# Patient Record
Sex: FEMALE | Race: BLACK OR AFRICAN AMERICAN | NOT HISPANIC OR LATINO | URBAN - METROPOLITAN AREA
[De-identification: names, ages, dates, MRNs, and addresses within clinical notes are randomized per-mention and may not be internally consistent; named-entity substitution may affect disease eponyms.]

---

## 2017-06-17 ENCOUNTER — EMERGENCY (EMERGENCY)
Facility: HOSPITAL | Age: 57
LOS: 0 days | Discharge: HOME | End: 2017-06-18
Attending: EMERGENCY MEDICINE
Payer: COMMERCIAL

## 2017-06-17 VITALS
HEART RATE: 102 BPM | DIASTOLIC BLOOD PRESSURE: 74 MMHG | SYSTOLIC BLOOD PRESSURE: 140 MMHG | RESPIRATION RATE: 25 BRPM | TEMPERATURE: 98 F | WEIGHT: 270.07 LBS | HEIGHT: 64 IN

## 2017-06-17 DIAGNOSIS — R07.89 OTHER CHEST PAIN: ICD-10-CM

## 2017-06-17 DIAGNOSIS — Z98.89 OTHER SPECIFIED POSTPROCEDURAL STATES: Chronic | ICD-10-CM

## 2017-06-17 DIAGNOSIS — I10 ESSENTIAL (PRIMARY) HYPERTENSION: ICD-10-CM

## 2017-06-17 DIAGNOSIS — E66.9 OBESITY, UNSPECIFIED: ICD-10-CM

## 2017-06-17 DIAGNOSIS — Z79.82 LONG TERM (CURRENT) USE OF ASPIRIN: ICD-10-CM

## 2017-06-17 DIAGNOSIS — Z79.4 LONG TERM (CURRENT) USE OF INSULIN: ICD-10-CM

## 2017-06-17 DIAGNOSIS — E10.9 TYPE 1 DIABETES MELLITUS WITHOUT COMPLICATIONS: ICD-10-CM

## 2017-06-17 LAB
ALBUMIN SERPL ELPH-MCNC: 3.7 G/DL — SIGNIFICANT CHANGE UP (ref 3.3–5)
ALP SERPL-CCNC: 76 U/L — SIGNIFICANT CHANGE UP (ref 40–120)
ALT FLD-CCNC: 23 U/L — SIGNIFICANT CHANGE UP (ref 12–78)
ANION GAP SERPL CALC-SCNC: 8 MMOL/L — SIGNIFICANT CHANGE UP (ref 5–17)
APTT BLD: 31.5 SEC — SIGNIFICANT CHANGE UP (ref 27.5–37.4)
AST SERPL-CCNC: 15 U/L — SIGNIFICANT CHANGE UP (ref 15–37)
BASOPHILS # BLD AUTO: 0.1 K/UL — SIGNIFICANT CHANGE UP (ref 0–0.2)
BASOPHILS NFR BLD AUTO: 1.1 % — SIGNIFICANT CHANGE UP (ref 0–2)
BILIRUB DIRECT SERPL-MCNC: 0.11 MG/DL — SIGNIFICANT CHANGE UP (ref 0.05–0.2)
BILIRUB INDIRECT FLD-MCNC: 0.2 MG/DL — SIGNIFICANT CHANGE UP (ref 0.2–1)
BILIRUB SERPL-MCNC: 0.3 MG/DL — SIGNIFICANT CHANGE UP (ref 0.2–1.2)
BILIRUB SERPL-MCNC: 0.3 MG/DL — SIGNIFICANT CHANGE UP (ref 0.2–1.2)
BLD GP AB SCN SERPL QL: SIGNIFICANT CHANGE UP
BUN SERPL-MCNC: 11 MG/DL — SIGNIFICANT CHANGE UP (ref 7–23)
CALCIUM SERPL-MCNC: 9.2 MG/DL — SIGNIFICANT CHANGE UP (ref 8.5–10.1)
CHLORIDE SERPL-SCNC: 103 MMOL/L — SIGNIFICANT CHANGE UP (ref 96–108)
CK MB CFR SERPL CALC: 1.9 NG/ML — SIGNIFICANT CHANGE UP (ref 0.5–3.6)
CO2 SERPL-SCNC: 30 MMOL/L — SIGNIFICANT CHANGE UP (ref 22–31)
CREAT SERPL-MCNC: 0.98 MG/DL — SIGNIFICANT CHANGE UP (ref 0.5–1.3)
D DIMER BLD IA.RAPID-MCNC: <150 NG/ML DDU — SIGNIFICANT CHANGE UP
EOSINOPHIL # BLD AUTO: 0.1 K/UL — SIGNIFICANT CHANGE UP (ref 0–0.5)
EOSINOPHIL NFR BLD AUTO: 1.2 % — SIGNIFICANT CHANGE UP (ref 0–6)
GLUCOSE SERPL-MCNC: 249 MG/DL — HIGH (ref 70–99)
HCT VFR BLD CALC: 40.3 % — SIGNIFICANT CHANGE UP (ref 34.5–45)
HGB BLD-MCNC: 13.4 G/DL — SIGNIFICANT CHANGE UP (ref 11.5–15.5)
INR BLD: 1.05 RATIO — SIGNIFICANT CHANGE UP (ref 0.88–1.16)
LIDOCAIN IGE QN: 195 U/L — SIGNIFICANT CHANGE UP (ref 73–393)
LYMPHOCYTES # BLD AUTO: 2.7 K/UL — SIGNIFICANT CHANGE UP (ref 1–3.3)
LYMPHOCYTES # BLD AUTO: 28 % — SIGNIFICANT CHANGE UP (ref 13–44)
MAGNESIUM SERPL-MCNC: 1.9 MG/DL — SIGNIFICANT CHANGE UP (ref 1.6–2.6)
MCHC RBC-ENTMCNC: 26.5 PG — LOW (ref 27–34)
MCHC RBC-ENTMCNC: 33.3 GM/DL — SIGNIFICANT CHANGE UP (ref 32–36)
MCV RBC AUTO: 79.6 FL — LOW (ref 80–100)
MONOCYTES # BLD AUTO: 0.5 K/UL — SIGNIFICANT CHANGE UP (ref 0–0.9)
MONOCYTES NFR BLD AUTO: 5.3 % — SIGNIFICANT CHANGE UP (ref 2–14)
NEUTROPHILS # BLD AUTO: 6.1 K/UL — SIGNIFICANT CHANGE UP (ref 1.8–7.4)
NEUTROPHILS NFR BLD AUTO: 64.4 % — SIGNIFICANT CHANGE UP (ref 43–77)
NT-PROBNP SERPL-SCNC: 44 PG/ML — SIGNIFICANT CHANGE UP (ref 0–125)
PLATELET # BLD AUTO: 217 K/UL — SIGNIFICANT CHANGE UP (ref 150–400)
POTASSIUM SERPL-MCNC: 3.8 MMOL/L — SIGNIFICANT CHANGE UP (ref 3.5–5.3)
POTASSIUM SERPL-SCNC: 3.8 MMOL/L — SIGNIFICANT CHANGE UP (ref 3.5–5.3)
PROT SERPL-MCNC: 8.1 GM/DL — SIGNIFICANT CHANGE UP (ref 6–8.3)
PROTHROM AB SERPL-ACNC: 11.5 SEC — SIGNIFICANT CHANGE UP (ref 9.8–12.7)
RBC # BLD: 5.06 M/UL — SIGNIFICANT CHANGE UP (ref 3.8–5.2)
RBC # FLD: 14.8 % — SIGNIFICANT CHANGE UP (ref 11–15)
SODIUM SERPL-SCNC: 141 MMOL/L — SIGNIFICANT CHANGE UP (ref 135–145)
TROPONIN I SERPL-MCNC: <.015 NG/ML — SIGNIFICANT CHANGE UP (ref 0.01–0.04)
WBC # BLD: 9.5 K/UL — SIGNIFICANT CHANGE UP (ref 3.8–10.5)
WBC # FLD AUTO: 9.5 K/UL — SIGNIFICANT CHANGE UP (ref 3.8–10.5)

## 2017-06-17 PROCEDURE — 74174 CTA ABD&PLVS W/CONTRAST: CPT | Mod: 26

## 2017-06-17 PROCEDURE — 71010: CPT | Mod: 26

## 2017-06-17 PROCEDURE — 99285 EMERGENCY DEPT VISIT HI MDM: CPT

## 2017-06-17 PROCEDURE — 71275 CT ANGIOGRAPHY CHEST: CPT | Mod: 26

## 2017-06-17 RX ORDER — MORPHINE SULFATE 50 MG/1
4 CAPSULE, EXTENDED RELEASE ORAL ONCE
Qty: 0 | Refills: 0 | Status: DISCONTINUED | OUTPATIENT
Start: 2017-06-17 | End: 2017-06-17

## 2017-06-17 RX ORDER — ASPIRIN/CALCIUM CARB/MAGNESIUM 324 MG
325 TABLET ORAL ONCE
Qty: 0 | Refills: 0 | Status: COMPLETED | OUTPATIENT
Start: 2017-06-17 | End: 2017-06-17

## 2017-06-17 RX ADMIN — Medication 325 MILLIGRAM(S): at 19:37

## 2017-06-17 RX ADMIN — MORPHINE SULFATE 4 MILLIGRAM(S): 50 CAPSULE, EXTENDED RELEASE ORAL at 21:23

## 2017-06-17 RX ADMIN — MORPHINE SULFATE 4 MILLIGRAM(S): 50 CAPSULE, EXTENDED RELEASE ORAL at 22:23

## 2017-06-17 NOTE — ED ADULT NURSE NOTE - OBJECTIVE STATEMENT
pt c/o chest pain since Wednesday took Aspirin without relief. Pain is in her chest and radiating to the back and also under the b/l  breast.

## 2017-06-17 NOTE — ED PROVIDER NOTE - MEDICAL DECISION MAKING DETAILS
Patient pw chest pain concerning for acs vs dissection vs pe vs pancreatitis vs pericarditis. Patient pw chest pain concerning for acs vs dissection vs pe vs pancreatitis vs pericarditis. ekg does not suggest pericarditis in my read of it, just sinus tach. cxray neg for acute cardiopulmonary process. mediastinum is not wide. will cta for dissection. Patient pw chest pain concerning for acs vs dissection vs pe vs pancreatitis vs pericarditis. ekg does not suggest pericarditis in my read of it, just sinus tach. cxray neg for acute cardiopulmonary process. mediastinum is not wide. will cta for dissection. patient trops negative and her cta was reassuring. she is feeling well now. okay for dc.

## 2017-06-17 NOTE — ED PROVIDER NOTE - OBJECTIVE STATEMENT
Pertinent PMH/PSH/FHx/SHx and Review of Systems contained within:  56F hx of iddm, htn, obesity pw chest pain x2 days. had stress test 5/13 - does not know results - and developed the cp 5/14. under left breast and midsternal, radiating to back, associated with the sensation of sob. no cough or uri symptoms. no trauma  Fh and Sh not otherwise contributory  ROS otherwise negative

## 2017-06-17 NOTE — ED ADULT TRIAGE NOTE - HEIGHT IN INCHES
Thanks for working so hard on this Neida, sorry I did not let you know it was not stat.    Jeffery Sherwood M.D.     4

## 2017-06-17 NOTE — ED ADULT TRIAGE NOTE - CHIEF COMPLAINT QUOTE
pt states she had a stress test on Tuesday and started having chest pain on Wednesday radiating to her back. Pt states pain worsens with activity

## 2017-06-17 NOTE — ED PROVIDER NOTE - PHYSICAL EXAMINATION
Gen: Alert, obese aa female, appears a little uncomfortable   Head: NC, AT   Eyes: PERRL, EOMI, normal lids/conjunctiva  ENT: normal hearing, patent oropharynx without erythema/exudate, uvula midline  Neck: supple, no tenderness, Trachea midline  Pulm: Bilateral BS, normal resp effort, no wheeze/stridor/retractions  CV: RRR, no M/R/G, 2+ radial and dp pulses bl, no edema  Abd: soft, NT/ND, +BS, no hepatosplenomegaly  Mskel: extremities x4 with normal ROM and no joint effusions. no ctl spine ttp.   Skin: no rash, no bruising   Neuro: AAOx3, no sensory/motor deficits, CN 2-12 intact

## 2017-06-18 VITALS
SYSTOLIC BLOOD PRESSURE: 143 MMHG | OXYGEN SATURATION: 98 % | TEMPERATURE: 98 F | HEART RATE: 81 BPM | RESPIRATION RATE: 19 BRPM | DIASTOLIC BLOOD PRESSURE: 68 MMHG

## 2017-06-18 LAB — TROPONIN I SERPL-MCNC: <.015 NG/ML — SIGNIFICANT CHANGE UP (ref 0.01–0.04)

## 2018-01-03 ENCOUNTER — RESULT REVIEW (OUTPATIENT)
Age: 58
End: 2018-01-03

## 2018-08-30 ENCOUNTER — RESULT REVIEW (OUTPATIENT)
Age: 58
End: 2018-08-30

## 2018-09-13 ENCOUNTER — RESULT REVIEW (OUTPATIENT)
Age: 58
End: 2018-09-13

## 2019-03-04 ENCOUNTER — EMERGENCY (EMERGENCY)
Facility: HOSPITAL | Age: 59
LOS: 0 days | Discharge: HOME | End: 2019-03-05
Attending: EMERGENCY MEDICINE
Payer: COMMERCIAL

## 2019-03-04 VITALS
OXYGEN SATURATION: 94 % | WEIGHT: 259.93 LBS | SYSTOLIC BLOOD PRESSURE: 174 MMHG | TEMPERATURE: 99 F | HEART RATE: 103 BPM | HEIGHT: 65 IN | RESPIRATION RATE: 17 BRPM | DIASTOLIC BLOOD PRESSURE: 89 MMHG

## 2019-03-04 DIAGNOSIS — E11.9 TYPE 2 DIABETES MELLITUS WITHOUT COMPLICATIONS: ICD-10-CM

## 2019-03-04 DIAGNOSIS — I10 ESSENTIAL (PRIMARY) HYPERTENSION: ICD-10-CM

## 2019-03-04 DIAGNOSIS — K51.00 ULCERATIVE (CHRONIC) PANCOLITIS WITHOUT COMPLICATIONS: ICD-10-CM

## 2019-03-04 DIAGNOSIS — Z98.89 OTHER SPECIFIED POSTPROCEDURAL STATES: Chronic | ICD-10-CM

## 2019-03-04 DIAGNOSIS — R10.9 UNSPECIFIED ABDOMINAL PAIN: ICD-10-CM

## 2019-03-04 LAB
ALBUMIN SERPL ELPH-MCNC: 3.2 G/DL — LOW (ref 3.3–5)
ALP SERPL-CCNC: 87 U/L — SIGNIFICANT CHANGE UP (ref 40–120)
ALT FLD-CCNC: 14 U/L — SIGNIFICANT CHANGE UP (ref 12–78)
ANION GAP SERPL CALC-SCNC: 6 MMOL/L — SIGNIFICANT CHANGE UP (ref 5–17)
APTT BLD: 28.2 SEC — LOW (ref 28.5–37)
AST SERPL-CCNC: 11 U/L — LOW (ref 15–37)
BASOPHILS # BLD AUTO: 0.03 K/UL — SIGNIFICANT CHANGE UP (ref 0–0.2)
BASOPHILS NFR BLD AUTO: 0.4 % — SIGNIFICANT CHANGE UP (ref 0–2)
BILIRUB SERPL-MCNC: 0.5 MG/DL — SIGNIFICANT CHANGE UP (ref 0.2–1.2)
BUN SERPL-MCNC: 7 MG/DL — SIGNIFICANT CHANGE UP (ref 7–23)
CALCIUM SERPL-MCNC: 8.5 MG/DL — SIGNIFICANT CHANGE UP (ref 8.5–10.1)
CHLORIDE SERPL-SCNC: 101 MMOL/L — SIGNIFICANT CHANGE UP (ref 96–108)
CO2 SERPL-SCNC: 31 MMOL/L — SIGNIFICANT CHANGE UP (ref 22–31)
CREAT SERPL-MCNC: 0.7 MG/DL — SIGNIFICANT CHANGE UP (ref 0.5–1.3)
EOSINOPHIL # BLD AUTO: 0.25 K/UL — SIGNIFICANT CHANGE UP (ref 0–0.5)
EOSINOPHIL NFR BLD AUTO: 3.3 % — SIGNIFICANT CHANGE UP (ref 0–6)
GLUCOSE SERPL-MCNC: 267 MG/DL — HIGH (ref 70–99)
HCG SERPL-ACNC: 2 MIU/ML — SIGNIFICANT CHANGE UP
HCT VFR BLD CALC: 44.4 % — SIGNIFICANT CHANGE UP (ref 34.5–45)
HGB BLD-MCNC: 13.7 G/DL — SIGNIFICANT CHANGE UP (ref 11.5–15.5)
IMM GRANULOCYTES NFR BLD AUTO: 0.3 % — SIGNIFICANT CHANGE UP (ref 0–1.5)
INR BLD: 1.22 RATIO — HIGH (ref 0.88–1.16)
LACTATE SERPL-SCNC: 2 MMOL/L — SIGNIFICANT CHANGE UP (ref 0.7–2)
LIDOCAIN IGE QN: 99 U/L — SIGNIFICANT CHANGE UP (ref 73–393)
LYMPHOCYTES # BLD AUTO: 1.96 K/UL — SIGNIFICANT CHANGE UP (ref 1–3.3)
LYMPHOCYTES # BLD AUTO: 26.1 % — SIGNIFICANT CHANGE UP (ref 13–44)
MCHC RBC-ENTMCNC: 25.9 PG — LOW (ref 27–34)
MCHC RBC-ENTMCNC: 30.9 GM/DL — LOW (ref 32–36)
MCV RBC AUTO: 83.9 FL — SIGNIFICANT CHANGE UP (ref 80–100)
MONOCYTES # BLD AUTO: 1.24 K/UL — HIGH (ref 0–0.9)
MONOCYTES NFR BLD AUTO: 16.5 % — HIGH (ref 2–14)
NEUTROPHILS # BLD AUTO: 4.02 K/UL — SIGNIFICANT CHANGE UP (ref 1.8–7.4)
NEUTROPHILS NFR BLD AUTO: 53.4 % — SIGNIFICANT CHANGE UP (ref 43–77)
NRBC # BLD: 0 /100 WBCS — SIGNIFICANT CHANGE UP (ref 0–0)
PLATELET # BLD AUTO: 224 K/UL — SIGNIFICANT CHANGE UP (ref 150–400)
POTASSIUM SERPL-MCNC: 3.8 MMOL/L — SIGNIFICANT CHANGE UP (ref 3.5–5.3)
POTASSIUM SERPL-SCNC: 3.8 MMOL/L — SIGNIFICANT CHANGE UP (ref 3.5–5.3)
PROT SERPL-MCNC: 7.9 GM/DL — SIGNIFICANT CHANGE UP (ref 6–8.3)
PROTHROM AB SERPL-ACNC: 13.8 SEC — HIGH (ref 10–12.9)
RBC # BLD: 5.29 M/UL — HIGH (ref 3.8–5.2)
RBC # FLD: 15.3 % — HIGH (ref 10.3–14.5)
SODIUM SERPL-SCNC: 138 MMOL/L — SIGNIFICANT CHANGE UP (ref 135–145)
WBC # BLD: 7.52 K/UL — SIGNIFICANT CHANGE UP (ref 3.8–10.5)
WBC # FLD AUTO: 7.52 K/UL — SIGNIFICANT CHANGE UP (ref 3.8–10.5)

## 2019-03-04 PROCEDURE — 99284 EMERGENCY DEPT VISIT MOD MDM: CPT

## 2019-03-04 RX ORDER — SODIUM CHLORIDE 9 MG/ML
1000 INJECTION INTRAMUSCULAR; INTRAVENOUS; SUBCUTANEOUS ONCE
Qty: 0 | Refills: 0 | Status: COMPLETED | OUTPATIENT
Start: 2019-03-04 | End: 2019-03-04

## 2019-03-04 RX ORDER — MORPHINE SULFATE 50 MG/1
2 CAPSULE, EXTENDED RELEASE ORAL ONCE
Qty: 0 | Refills: 0 | Status: DISCONTINUED | OUTPATIENT
Start: 2019-03-04 | End: 2019-03-04

## 2019-03-04 RX ORDER — IOHEXOL 300 MG/ML
30 INJECTION, SOLUTION INTRAVENOUS ONCE
Qty: 0 | Refills: 0 | Status: COMPLETED | OUTPATIENT
Start: 2019-03-04 | End: 2019-03-04

## 2019-03-04 RX ADMIN — SODIUM CHLORIDE 1000 MILLILITER(S): 9 INJECTION INTRAMUSCULAR; INTRAVENOUS; SUBCUTANEOUS at 20:55

## 2019-03-04 RX ADMIN — IOHEXOL 30 MILLILITER(S): 300 INJECTION, SOLUTION INTRAVENOUS at 20:56

## 2019-03-04 NOTE — ED PROVIDER NOTE - CLINICAL SUMMARY MEDICAL DECISION MAKING FREE TEXT BOX
pt w abd pain due to pancolitis.  VSS, improving in ED w meds given.  Tolerated first dose of abx PO.  Pt given Rx and instructed/cautioned on use. Discussed results and outcome of testing with the patient, given copy as well.  Patient advised to please follow up with their primary care doctor within the next 24 hours and return to the Emergency Department for worsening symptoms or any other concerns.  Patient advised that their doctor may call  to follow up on the specific results of the tests performed today in the emergency department.

## 2019-03-04 NOTE — ED ADULT TRIAGE NOTE - CHIEF COMPLAINT QUOTE
abdominal pain left upper quadrant and left lower quadrant with nausea and diarrhea , denies vomiting , onset - Thursday

## 2019-03-04 NOTE — ED PROVIDER NOTE - OBJECTIVE STATEMENT
Pertinent PMH/PSH/FHx/SHx and Review of Systems contained within:    59yo F w PMH of HTN, DM, hx of  in past presents to ED for eval of L sided abd pain & loose stools x4d.  Pt states sx started w loose, nonbloody stools, then developed pain.  Stool nonbloody.  Denies vomiting, known sick contacts, ingestion of spoiled foods.  Pt states she has not had colonoscopy yet.    No fever/chills, No photophobia/eye pain/changes in vision, No ear pain/sore throat/dysphagia, No chest pain/palpitations, no SOB/cough/wheeze/stridor, +abdominal pain, No neck/back pain, no rash, no changes in neurological status/function.

## 2019-03-04 NOTE — ED PROVIDER NOTE - PHYSICAL EXAMINATION
Gen: Alert, c/o pain  Head: NC, AT, EOMI, normal lids/conjunctiva  ENT: normal hearing, patent oropharynx, MMM  Neck: supple, no tenderness/meningismus, FROM, Trachea midline  Pulm: Bilateral clear BS, normal resp effort, no wheeze/stridor/retractions  CV: RRR, no M/R/G, +dist pulses  Abd: soft, +LUQ & LLQ TTP, ND, +BS, no guarding/rebound tenderness  Mskel: no edema/erythema/cyanosis  Skin: no rash  Neuro: no sensory/motor deficits

## 2019-03-05 VITALS
RESPIRATION RATE: 18 BRPM | DIASTOLIC BLOOD PRESSURE: 76 MMHG | SYSTOLIC BLOOD PRESSURE: 161 MMHG | OXYGEN SATURATION: 93 % | HEART RATE: 104 BPM | TEMPERATURE: 98 F

## 2019-03-05 PROCEDURE — 74177 CT ABD & PELVIS W/CONTRAST: CPT | Mod: 26

## 2019-03-05 RX ORDER — CIPROFLOXACIN LACTATE 400MG/40ML
500 VIAL (ML) INTRAVENOUS ONCE
Qty: 0 | Refills: 0 | Status: COMPLETED | OUTPATIENT
Start: 2019-03-05 | End: 2019-03-05

## 2019-03-05 RX ORDER — METRONIDAZOLE 500 MG
1 TABLET ORAL
Qty: 21 | Refills: 0
Start: 2019-03-05 | End: 2019-03-11

## 2019-03-05 RX ORDER — METRONIDAZOLE 500 MG
500 TABLET ORAL ONCE
Qty: 0 | Refills: 0 | Status: COMPLETED | OUTPATIENT
Start: 2019-03-05 | End: 2019-03-05

## 2019-03-05 RX ORDER — MOXIFLOXACIN HYDROCHLORIDE TABLETS, 400 MG 400 MG/1
1 TABLET, FILM COATED ORAL
Qty: 20 | Refills: 0
Start: 2019-03-05 | End: 2019-03-14

## 2019-03-05 RX ADMIN — SODIUM CHLORIDE 1000 MILLILITER(S): 9 INJECTION INTRAMUSCULAR; INTRAVENOUS; SUBCUTANEOUS at 02:48

## 2019-03-05 RX ADMIN — Medication 500 MILLIGRAM(S): at 02:47

## 2019-03-05 RX ADMIN — Medication 500 MILLIGRAM(S): at 02:48

## 2019-05-15 ENCOUNTER — APPOINTMENT (OUTPATIENT)
Dept: GASTROENTEROLOGY | Facility: CLINIC | Age: 59
End: 2019-05-15
Payer: MEDICAID

## 2019-05-15 VITALS
WEIGHT: 265 LBS | OXYGEN SATURATION: 99 % | TEMPERATURE: 98.6 F | BODY MASS INDEX: 44.15 KG/M2 | HEIGHT: 65 IN | DIASTOLIC BLOOD PRESSURE: 70 MMHG | SYSTOLIC BLOOD PRESSURE: 130 MMHG | HEART RATE: 68 BPM

## 2019-05-15 VITALS — HEART RATE: 88 BPM | OXYGEN SATURATION: 85 %

## 2019-05-15 DIAGNOSIS — Z78.9 OTHER SPECIFIED HEALTH STATUS: ICD-10-CM

## 2019-05-15 DIAGNOSIS — Z83.3 FAMILY HISTORY OF DIABETES MELLITUS: ICD-10-CM

## 2019-05-15 DIAGNOSIS — R93.5 ABNORMAL FINDINGS ON DIAGNOSTIC IMAGING OF OTHER ABDOMINAL REGIONS, INCLUDING RETROPERITONEUM: ICD-10-CM

## 2019-05-15 DIAGNOSIS — Z82.49 FAMILY HISTORY OF ISCHEMIC HEART DISEASE AND OTHER DISEASES OF THE CIRCULATORY SYSTEM: ICD-10-CM

## 2019-05-15 DIAGNOSIS — K51.00 ULCERATIVE (CHRONIC) PANCOLITIS W/OUT COMPLICATIONS: ICD-10-CM

## 2019-05-15 DIAGNOSIS — E11.9 TYPE 2 DIABETES MELLITUS W/OUT COMPLICATIONS: ICD-10-CM

## 2019-05-15 DIAGNOSIS — I10 ESSENTIAL (PRIMARY) HYPERTENSION: ICD-10-CM

## 2019-05-15 PROCEDURE — 99204 OFFICE O/P NEW MOD 45 MIN: CPT

## 2019-05-15 NOTE — PHYSICAL EXAM
[General Appearance - Alert] : alert [General Appearance - In No Acute Distress] : in no acute distress [Sclera] : the sclera and conjunctiva were normal [Outer Ear] : the ears and nose were normal in appearance [Extraocular Movements] : extraocular movements were intact [PERRL With Normal Accommodation] : pupils were equal in size, round, and reactive to light [Oropharynx] : the oropharynx was normal [Neck Appearance] : the appearance of the neck was normal [Jugular Venous Distention Increased] : there was no jugular-venous distention [Neck Cervical Mass (___cm)] : no neck mass was observed [Thyroid Diffuse Enlargement] : the thyroid was not enlarged [Thyroid Nodule] : there were no palpable thyroid nodules [Auscultation Breath Sounds / Voice Sounds] : lungs were clear to auscultation bilaterally [Heart Rate And Rhythm] : heart rate was normal and rhythm regular [Heart Sounds Gallop] : no gallops [Heart Sounds] : normal S1 and S2 [Murmurs] : no murmurs [Heart Sounds Pericardial Friction Rub] : no pericardial rub [Bowel Sounds] : normal bowel sounds [Abdomen Soft] : soft [Abdomen Mass (___ Cm)] : no abdominal mass palpated [] : no hepato-splenomegaly [Abdomen Tenderness] : non-tender [No CVA Tenderness] : no ~M costovertebral angle tenderness [No Spinal Tenderness] : no spinal tenderness [Abnormal Walk] : normal gait [Nail Clubbing] : no clubbing  or cyanosis of the fingernails [Musculoskeletal - Swelling] : no joint swelling seen [Motor Tone] : muscle strength and tone were normal [Impaired Insight] : insight and judgment were intact [Oriented To Time, Place, And Person] : oriented to person, place, and time [Affect] : the affect was normal

## 2019-05-15 NOTE — REASON FOR VISIT
[Post Hospitalization] : a post hospitalization visit [FreeTextEntry1] : Abnormal CT, Morbid obesity, EDILBERTO

## 2019-05-15 NOTE — HISTORY OF PRESENT ILLNESS
[FreeTextEntry1] : Patient is a 58-year-old female who presented to the emergency room several weeks ago after complaining of left upper quadrant abdominal pain which seemed to persist. She had no fever but did have diarrhea. CAT scan revealed evidence of pan colitis. Her bowel movements now are back to her regular. She has no blood or mucus in the stool. The pain has subsided.\par \par Patient does have Morbid obesity and suffers from sleep apnea. She was supposed to have colonoscopy last week but pulse ox revealed hypoxemia. In the office today, her pulse ox is 85.

## 2019-05-15 NOTE — ASSESSMENT
[FreeTextEntry1] : Patient with morbid obesity and obstructive sleep apnea. Pulse ox reveales an O2 of 85. We will need pulmonary evaluation prior to colonoscopy. Patient had an episode of pain and CAT scan revealed pan colitis. This was likely due to infectious etiology which has resolved. FOBT will be sent to the lab. She will be scheduled for her colonoscopy after clearance from pulmonary.\par

## 2019-05-31 LAB — HEMOCCULT STL QL IA: NEGATIVE

## 2019-06-05 ENCOUNTER — APPOINTMENT (OUTPATIENT)
Dept: GASTROENTEROLOGY | Facility: CLINIC | Age: 59
End: 2019-06-05

## 2019-07-18 ENCOUNTER — APPOINTMENT (OUTPATIENT)
Dept: PULMONOLOGY | Facility: CLINIC | Age: 59
End: 2019-07-18
Payer: MEDICAID

## 2019-07-18 VITALS
SYSTOLIC BLOOD PRESSURE: 184 MMHG | DIASTOLIC BLOOD PRESSURE: 94 MMHG | HEART RATE: 85 BPM | RESPIRATION RATE: 18 BRPM | TEMPERATURE: 97 F | WEIGHT: 270 LBS | OXYGEN SATURATION: 95 % | BODY MASS INDEX: 46.1 KG/M2 | HEIGHT: 64 IN

## 2019-07-18 PROCEDURE — ZZZZZ: CPT

## 2019-07-18 PROCEDURE — 94060 EVALUATION OF WHEEZING: CPT

## 2019-07-18 PROCEDURE — 99205 OFFICE O/P NEW HI 60 MIN: CPT | Mod: 25

## 2019-07-18 PROCEDURE — 94729 DIFFUSING CAPACITY: CPT

## 2019-07-18 PROCEDURE — 94726 PLETHYSMOGRAPHY LUNG VOLUMES: CPT

## 2019-07-18 RX ORDER — INSULIN LISPRO 100 [IU]/ML
INJECTION, SOLUTION INTRAVENOUS; SUBCUTANEOUS
Refills: 0 | Status: ACTIVE | COMMUNITY

## 2019-07-18 RX ORDER — AMLODIPINE BESYLATE 10 MG/1
10 TABLET ORAL
Refills: 0 | Status: ACTIVE | COMMUNITY

## 2019-07-18 RX ORDER — ATORVASTATIN CALCIUM 10 MG/1
10 TABLET, FILM COATED ORAL
Refills: 0 | Status: ACTIVE | COMMUNITY

## 2019-07-18 RX ORDER — LOSARTAN POTASSIUM 100 MG/1
TABLET, FILM COATED ORAL
Refills: 0 | Status: ACTIVE | COMMUNITY

## 2019-07-18 RX ORDER — ASPIRIN 81 MG/1
81 TABLET, CHEWABLE ORAL
Refills: 0 | Status: ACTIVE | COMMUNITY

## 2019-07-18 RX ORDER — INSULIN GLARGINE 100 [IU]/ML
INJECTION, SOLUTION SUBCUTANEOUS
Refills: 0 | Status: ACTIVE | COMMUNITY

## 2019-07-19 NOTE — PHYSICAL EXAM
[General Appearance - Well Developed] : well developed [Normal Appearance] : normal appearance [Well Groomed] : well groomed [General Appearance - Well Nourished] : well nourished [No Deformities] : no deformities [General Appearance - In No Acute Distress] : no acute distress [IV] : IV [Heart Rate And Rhythm] : heart rate and rhythm were normal [Heart Sounds] : normal S1 and S2 [Murmurs] : no murmurs present [Respiration, Rhythm And Depth] : normal respiratory rhythm and effort [Exaggerated Use Of Accessory Muscles For Inspiration] : no accessory muscle use [Auscultation Breath Sounds / Voice Sounds] : lungs were clear to auscultation bilaterally [Abdomen Soft] : soft [Abdomen Tenderness] : non-tender [] : no hepato-splenomegaly [Abdomen Mass (___ Cm)] : no abdominal mass palpated [3+ Pitting] : 3+  pitting  [Oriented To Time, Place, And Person] : oriented to person, place, and time [Impaired Insight] : insight and judgment were intact [Affect] : the affect was normal [FreeTextEntry1] : morbidly obese [FreeTextEntry2] : b/l LE edema, tenderness to RLE

## 2019-07-19 NOTE — HISTORY OF PRESENT ILLNESS
[Obstructive Sleep Apnea] : obstructive sleep apnea [Snoring] : snoring [Daytime Somnolence] : daytime somnolence [Unintentional Sleep While Inactive] : unintentional sleep while inactive [Awakes Unrefreshed] : awakening unrefreshed [Awakes with Headache] : headache upon awakening [DIS] : DIS [Lower Extremity Discomfort] : lower extremity discomfort in evening or at bedtime [To Bed: ___] : ~he/she~ goes to bed at [unfilled] [Sleep Onset Latency: ___ minutes] : sleep onset latency of [unfilled] minutes reported [TST: ___] : Total sleep time is [unfilled] [Daytime Sleep: ___] : daytime sleep: [unfilled] [Worsened] : have worsened [Difficulty Breathing During Exertion] : worsened dyspnea on exertion [Feelings Of Weakness On Exertion] : worsened exercise intolerance [Cough] : denies coughing [Wheezing] : worsened wheezing [Regional Soft Tissue Swelling Both Lower Extremities] : worsened lower extremity edema [Chest Pain Or Discomfort] : denies chest pain [Fever] : denies fever [ESS: ___] : ESS score [unfilled] [FreeTextEntry1] : 57yo female presents for pulmonary clearance and EDILBERTO referred by AUGUST Mckeon. On her way to PFT was noted to be hypoxemic 02 saturation 84% on RA prior to PFT. Normalized with oxygen 2L NC. Has h/o HTN, DM and EDILBERTO that are uncontrolled. Endorsing hasn't eaten or drank anything today.  Doesn't regularly take insulin as prescribed as doesn't always eat.  Didn't take her BP meds today and BP elevated during OV.  EDILBERTO unclear of severity was prescribed CPAP with [employer] unsure of settings, but stopped using due to frequent URIs due to usage.  Endorses clening machine appropriately. \par \par PULM: Decreased exercise tolerance due to weight. Endorsing able to walk 15 minutes and develops heavy breathing. Unable to walk up 1 flight of stairs. Never prescribed oxygen prior, but doesn't have pulse oximeter to check at home. \par No h/o PNA, intubation. Bronchtisis twice in lifetime. No recent antibiotics, no recent prednisone. \par Never smoker/denies second hand smoke exposure\par no fmx of cancer\par \par Sleep: previously diagnosed with EDILBERTO untreated for a few years. Is amenable to CPAP therapy at present as cc: daytime somnolence. 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 She doesn't sleep at night due to being up with her mother. Will nap during the day while daughter is at school. Sleeping pattern is very erractic. Naps 4-5x/day for atleast 30-60 min.  Endorses TST in 24 hrs in 4-6 hours broken.\par Drink 40 oz of caffeine/day \par Soda 4 cans/week. \par

## 2019-07-19 NOTE — END OF VISIT
[FreeTextEntry3] : I agree with the nurse practitioners history, physical examination and plan of care. I personally elicited a history and examined the patient\par \par  [>50% of Time Spent on Counseling and Coordination of Care for  ___] : Greater than 50% of the encounter time was spent on counseling and coordination of care for [unfilled] [Time Spent: ___ minutes] : I have spent [unfilled] minutes of face to face time with the patient

## 2019-07-19 NOTE — REVIEW OF SYSTEMS
[Fatigue] : fatigue [Hypertension] : ~T hypertension [Orthopnea] : orthopnea [Edema] : ~T edema was present [Reflux] : reflux [Myalgias] : myalgias [Arthralgias] : arthralgias [Rash] : [unfilled] rash [Depression] : depression [Anxiety] : anxiety [Diabetes] : diabetes mellitus [As Noted in HPI] : as noted in HPI [Difficulty Initiating Sleep] : difficulty falling asleep [Snoring] : snoring [Nonrestorative Sleep] : nonrestorative sleep [Awakes With Headache] : awakes with a headache [Awakes With Dry Mouth] : awakes with dry mouth [Hypersomnolence] : sleeping much more than usual [Negative] : Pulmonary Hypertension [Dyspnea] : dyspnea [Wheezing] : wheezing [FreeTextEntry5] : incontent

## 2019-07-19 NOTE — REASON FOR VISIT
[Initial Evaluation] : an initial evaluation [Sleep Apnea] : sleep apnea [FreeTextEntry2] : hypoxemia

## 2019-07-19 NOTE — ASSESSMENT
[FreeTextEntry1] : 59yo female presents for pulmonary clearance and untreated EDILBERTO referred by GI Dr. Mckeon. 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 \par \par Hypoxemia- may be due to Obesity hypoventilation Syndrome\par -During OV hypoxemic 84% on RA at rest. Placed on 2L NC with improvement to 97%. \par on RA, with hyperventilation, O2 sat increased from 90 to 99%\par -ordered oxygen ATC 2L NC to be used ATC. Will request to be expedited\par \par EDILBERTO - Hx of EDILBERTO, used CPAP years ago. Unknown severity. The patient has signs and symptoms suggestive of sleep disordered breathing. Therapeutic modalities were discussed with the patient and a SPLIT sleep study will be scheduled with transcutaneous Co2 monitoring in lab. \par \par Restrictive Ventilatory defect on PFT.:\par -Ordered Ct chest w/o contrast\par \par Medical release form for cardiology test results. TTE.\par \par Preoperative clearance - pt is NOT optimized from respiratory standpoint for colonoscopy. She is at high risk for pulmonary complications from anesthesia given that she desaturates on RA to low 80s with minimal exertion.

## 2019-10-11 ENCOUNTER — EMERGENCY (EMERGENCY)
Facility: HOSPITAL | Age: 59
LOS: 0 days | Discharge: HOME | End: 2019-10-11
Payer: COMMERCIAL

## 2019-10-11 VITALS
HEART RATE: 86 BPM | TEMPERATURE: 98 F | OXYGEN SATURATION: 94 % | DIASTOLIC BLOOD PRESSURE: 78 MMHG | HEIGHT: 65 IN | RESPIRATION RATE: 20 BRPM | SYSTOLIC BLOOD PRESSURE: 179 MMHG | WEIGHT: 259.93 LBS

## 2019-10-11 DIAGNOSIS — Z79.82 LONG TERM (CURRENT) USE OF ASPIRIN: ICD-10-CM

## 2019-10-11 DIAGNOSIS — Z98.89 OTHER SPECIFIED POSTPROCEDURAL STATES: Chronic | ICD-10-CM

## 2019-10-11 DIAGNOSIS — Z79.84 LONG TERM (CURRENT) USE OF ORAL HYPOGLYCEMIC DRUGS: ICD-10-CM

## 2019-10-11 DIAGNOSIS — M25.571 PAIN IN RIGHT ANKLE AND JOINTS OF RIGHT FOOT: ICD-10-CM
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
## 2019-10-11 DIAGNOSIS — S93.401A SPRAIN OF UNSPECIFIED LIGAMENT OF RIGHT ANKLE, INITIAL ENCOUNTER: ICD-10-CM

## 2019-10-11 PROCEDURE — 73610 X-RAY EXAM OF ANKLE: CPT | Mod: 26,RT

## 2019-10-11 PROCEDURE — 99283 EMERGENCY DEPT VISIT LOW MDM: CPT

## 2019-10-11 RX ORDER — IBUPROFEN 200 MG
1 TABLET ORAL
Qty: 20 | Refills: 0
Start: 2019-10-11 | End: 2019-10-15

## 2019-10-11 RX ORDER — IBUPROFEN 200 MG
600 TABLET ORAL ONCE
Refills: 0 | Status: COMPLETED | OUTPATIENT
Start: 2019-10-11 | End: 2019-10-11

## 2019-10-11 RX ORDER — LISINOPRIL 2.5 MG/1
1 TABLET ORAL
Qty: 0 | Refills: 0 | DISCHARGE

## 2019-10-11 RX ORDER — DICLOFENAC SODIUM 75 MG/1
1 TABLET, DELAYED RELEASE ORAL
Qty: 0 | Refills: 0 | DISCHARGE

## 2019-10-11 RX ADMIN — Medication 600 MILLIGRAM(S): at 21:48

## 2019-10-11 NOTE — ED PROVIDER NOTE - PATIENT PORTAL LINK FT
You can access the FollowMyHealth Patient Portal offered by [facility] by registering at the following website: [email or web address]. By joining [employer]’s FollowMyHealth portal, you will also be able to view your health information using other applications (apps) compatible with our system.

## 2019-10-11 NOTE — ED PROVIDER NOTE - OBJECTIVE STATEMENT
58F here with right ankle pain after twisting injury yesterday. She has been ambulatory, pain to posterior and medial ankle. She has chronic leg swelling but unchanged. No numbness or weakness. Did not take anything at home for pain.

## 2019-10-11 NOTE — ED PROVIDER NOTE - MUSCULOSKELETAL, MLM
right medial ankle tenderness, good ROM, trace bilateral lower extremity edema - chronic per patient

## 2019-10-11 NOTE — ED ADULT TRIAGE NOTE - CHIEF COMPLAINT QUOTE
Pt c/o R ankle pain and swelling. Pt stated she twisted her ankle yesterday while trying to put shoes on. H/O HTN, DM

## 2019-10-11 NOTE — ED PROVIDER NOTE - CLINICAL SUMMARY MEDICAL DECISION MAKING FREE TEXT BOX
Patient with ankle pain after twisting injury, likely sprain, given tenderness will check plain film, recommend short course of NSAIDs. Also discussed with her needs PMD f/u for tighter BP control/asymptomatic HTN

## 2019-10-11 NOTE — ED ADULT NURSE NOTE - OBJECTIVE STATEMENT
received ft c/o r posterior ankle pain since yesterday states twisted ankle when putting on shoe denies fall pain worse with weight bearing b/l pedal edema noted pt states not new problem ambulatory with increased pain

## 2019-11-06 ENCOUNTER — APPOINTMENT (OUTPATIENT)
Dept: SLEEP CENTER | Facility: CLINIC | Age: 59
End: 2019-11-06
Payer: MEDICAID

## 2019-11-06 ENCOUNTER — OUTPATIENT (OUTPATIENT)
Dept: OUTPATIENT SERVICES | Facility: HOSPITAL | Age: 59
LOS: 1 days | End: 2019-11-06
Payer: MEDICAID

## 2019-11-06 DIAGNOSIS — Z98.89 OTHER SPECIFIED POSTPROCEDURAL STATES: Chronic | ICD-10-CM

## 2019-11-06 PROCEDURE — 95811 POLYSOM 6/>YRS CPAP 4/> PARM: CPT

## 2019-11-06 PROCEDURE — 95811 POLYSOM 6/>YRS CPAP 4/> PARM: CPT | Mod: 26

## 2019-11-07 DIAGNOSIS — G47.33 OBSTRUCTIVE SLEEP APNEA (ADULT) (PEDIATRIC): ICD-10-CM

## 2019-11-21 ENCOUNTER — RESULT REVIEW (OUTPATIENT)
Age: 59
End: 2019-11-21

## 2019-12-17 ENCOUNTER — APPOINTMENT (OUTPATIENT)
Dept: PULMONOLOGY | Facility: CLINIC | Age: 59
End: 2019-12-17
Payer: MEDICAID

## 2019-12-17 VITALS
HEART RATE: 88 BPM | BODY MASS INDEX: 46.1 KG/M2 | WEIGHT: 270 LBS | OXYGEN SATURATION: 92 % | DIASTOLIC BLOOD PRESSURE: 106 MMHG | TEMPERATURE: 97 F | SYSTOLIC BLOOD PRESSURE: 173 MMHG | HEIGHT: 64 IN | RESPIRATION RATE: 15 BRPM

## 2019-12-17 DIAGNOSIS — L03.115 CELLULITIS OF RIGHT LOWER LIMB: ICD-10-CM

## 2019-12-17 DIAGNOSIS — R06.00 DYSPNEA, UNSPECIFIED: ICD-10-CM

## 2019-12-17 PROCEDURE — 94618 PULMONARY STRESS TESTING: CPT

## 2019-12-17 PROCEDURE — ZZZZZ: CPT

## 2019-12-17 PROCEDURE — 99215 OFFICE O/P EST HI 40 MIN: CPT | Mod: 25

## 2019-12-17 RX ORDER — LOSARTAN POTASSIUM AND HYDROCHLOROTHIAZIDE 25; 100 MG/1; MG/1
100-25 TABLET ORAL
Refills: 0 | Status: DISCONTINUED | COMMUNITY
End: 2019-12-17

## 2019-12-17 RX ORDER — UMECLIDINIUM 62.5 UG/1
62.5 AEROSOL, POWDER ORAL DAILY
Qty: 1 | Refills: 5 | Status: ACTIVE | COMMUNITY
Start: 2019-12-17 | End: 1900-01-01

## 2019-12-20 ENCOUNTER — RESULT REVIEW (OUTPATIENT)
Age: 59
End: 2019-12-20

## 2019-12-20 LAB
A1AT SERPL-MCNC: 138 MG/DL
ALBUMIN SERPL ELPH-MCNC: 4.4 G/DL
ALP BLD-CCNC: 106 U/L
ALT SERPL-CCNC: 18 U/L
ANION GAP SERPL CALC-SCNC: 14 MMOL/L
AST SERPL-CCNC: 13 U/L
BASOPHILS # BLD AUTO: 0.03 K/UL
BASOPHILS NFR BLD AUTO: 0.4 %
BILIRUB SERPL-MCNC: 0.5 MG/DL
BUN SERPL-MCNC: 12 MG/DL
CALCIUM SERPL-MCNC: 10.2 MG/DL
CHLORIDE SERPL-SCNC: 97 MMOL/L
CO2 SERPL-SCNC: 34 MMOL/L
CREAT SERPL-MCNC: 0.54 MG/DL
EOSINOPHIL # BLD AUTO: 0.1 K/UL
EOSINOPHIL NFR BLD AUTO: 1.2 %
GLUCOSE SERPL-MCNC: 214 MG/DL
HCT VFR BLD CALC: 47.2 %
HGB BLD-MCNC: 13.9 G/DL
IMM GRANULOCYTES NFR BLD AUTO: 0.4 %
LYMPHOCYTES # BLD AUTO: 2.02 K/UL
LYMPHOCYTES NFR BLD AUTO: 23.7 %
MAN DIFF?: NORMAL
MCHC RBC-ENTMCNC: 26.5 PG
MCHC RBC-ENTMCNC: 29.4 GM/DL
MCV RBC AUTO: 89.9 FL
MONOCYTES # BLD AUTO: 0.64 K/UL
MONOCYTES NFR BLD AUTO: 7.5 %
NEUTROPHILS # BLD AUTO: 5.7 K/UL
NEUTROPHILS NFR BLD AUTO: 66.8 %
PLATELET # BLD AUTO: 226 K/UL
POTASSIUM SERPL-SCNC: 4.9 MMOL/L
PROT SERPL-MCNC: 7.9 G/DL
RBC # BLD: 5.25 M/UL
RBC # FLD: 16.7 %
SODIUM SERPL-SCNC: 145 MMOL/L
WBC # FLD AUTO: 8.52 K/UL

## 2019-12-23 NOTE — ASSESSMENT
[FreeTextEntry1] : ATTENDING ATTESTATION\par \par 58yoF presents for f/u for SOB, severe EDILBERTO, DM, HTN. Didn't receive bilevel from [employer], and hasn't received larger stationary concentrator 10L [facility]. \par \par Pulm: pt with severe EDILBERTO on split, needed Bilevel 20/9 with 6 LPM O2 to correct AHI and O2. Pt does not tolerate CPAP.\par Will order Bilevel and need to switch out current concentrator (only goes up to 5 LPM) to 10 LPM. \par Check alpha-1 -antitrypsin given airtrapping but mostly moderate restriction on PFT. \par Consider Trial of LAMA Incruse ellipta provided sample in office with demo\par Pt never smoker, with airtrapping reported on July CT chest. Pt needs to bring in CDROM of this CT\par Recommending using O2 4L ATC\par \par EDILBERTO\par -needs to obtain bilevel from [employer] will f/u status\par -use bilevel nightly with 6L NC bled in\par \par Cellulitis\par - PCP had recent duplex bilateral LEs which were negative for DVT per pt.\par -check labs - No leukocytosis but increased erythema, warmth and pain.\par -refer to ID for further management\par \par HTN\par -request stress test, echo from CV \par -needs to re-establish care with new cardiologist\par -repeat echo will call with results \par \par f/u in 1 after using bilevel and mail CD rom to office for review

## 2019-12-23 NOTE — END OF VISIT
[>50% of Time Spent on Counseling for ____] : Greater than 50% of the encounter time was spent on counseling for [unfilled] [Time Spent: ___ minutes] : I have spent [unfilled] minutes of face to face time with the patient [FreeTextEntry3] : I agree with the nurse practitioners history, physical examination and plan of care. I personally elicited a history and examined the patient. See above attestation.\par \par

## 2019-12-23 NOTE — PHYSICAL EXAM
[General Appearance - Well Developed] : well developed [General Appearance - Well Nourished] : well nourished [Normal Appearance] : normal appearance [Well Groomed] : well groomed [General Appearance - In No Acute Distress] : no acute distress [No Deformities] : no deformities [IV] : IV [Heart Rate And Rhythm] : heart rate and rhythm were normal [Heart Sounds] : normal S1 and S2 [Murmurs] : no murmurs present [Exaggerated Use Of Accessory Muscles For Inspiration] : no accessory muscle use [Respiration, Rhythm And Depth] : normal respiratory rhythm and effort [] : no respiratory distress [Non-Pitting] : non-pitting [Auscultation Breath Sounds / Voice Sounds] : lungs were clear to auscultation bilaterally [Oriented To Time, Place, And Person] : oriented to person, place, and time [Affect] : the affect was normal [Impaired Insight] : insight and judgment were intact [FreeTextEntry1] : morbidly obese [FreeTextEntry2] : RLE erythema -exudate/drainage, edematous +3 B/L LE +3 edema

## 2019-12-23 NOTE — HISTORY OF PRESENT ILLNESS
[Difficulty Breathing During Exertion] : worsened dyspnea on exertion [Cough] : denies coughing [Feelings Of Weakness On Exertion] : worsened exercise intolerance [Regional Soft Tissue Swelling Both Lower Extremities] : worsened lower extremity edema [Wheezing] : denies wheezing [Chest Pain Or Discomfort] : denies chest pain [Fever] : denies fever [FreeTextEntry1] : 58yoF presents for f/u for SOB, severe EDILBERTO, DM, HTN. Didn't receive bilevel from [employer], and hasn't received larger stationary concentrator 10L [facility]. \par \par Endorsing since LOV increased SOB with exertion, no sick contacts/fevers/chills. Hasn't been using oxygen since LOV. Currently has inogen POC and stationary concentrator that only goes up to 5L. 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 Report 7/29/19 demonstrates 3 mm nodule in posterior segment of RUL. Patient is of low risk, non smoker, no h/o CA personally-will not need further f/u for this nodule. Mosaic attenuation pattern bilaterally, likely air trapping. No evidence of bronchiectasis or pulmonary fibrosis, no pleural effusion, or pericardial effusion. No lymphadenopathy. 1.6cm left adrenal nodule which measures fluid attenuation, consistent with an adenoma. PRIOR CT ABD/CHEST from 2019 and 2017 respectively. Repeat imaging in 1 year to follow up size of adrenal nodule.\par \par CV: unclear of last echo or stress test. Previous cardiologist no longer at practice and hasn't f/u since 2018. Is currently on HTN med and diuretics under discretion of PCP. \par \par DM: is currently on insulin both short acting and long acting. Doesn't check FS. Endorsing bad with DM management. Endorsing RLE with swelling, erythema, warmth to touch, and pain. PCP evaluated in Nov with B/L US -DVT was placed on Furosemide 20mg BID, w/o improvement. Endorsing edema has been present since June, but erythema as expaned in past 1-2 months.  Denies fevers/chills/trauma to area\par Doesn't have endocrinologist was being managed by PCP.

## 2019-12-23 NOTE — REVIEW OF SYSTEMS
[Edema] : ~T edema was present [Hypertension] : ~T hypertension [As Noted in HPI] : as noted in HPI [Arthralgias] : arthralgias [Diabetes] : diabetes mellitus [Negative] : Gastrointestinal [FreeTextEntry8] : SOB with exertion

## 2019-12-23 NOTE — ADDENDUM
[FreeTextEntry1] : Patient has tried and failed CPAP therapy. It didn't correct her EDILBERTO, and pt needs bilevel.

## 2019-12-25 ENCOUNTER — EMERGENCY (EMERGENCY)
Facility: HOSPITAL | Age: 59
LOS: 0 days | Discharge: HOME | End: 2019-12-25
Attending: EMERGENCY MEDICINE
Payer: COMMERCIAL

## 2019-12-25 VITALS
OXYGEN SATURATION: 94 % | SYSTOLIC BLOOD PRESSURE: 144 MMHG | DIASTOLIC BLOOD PRESSURE: 74 MMHG | RESPIRATION RATE: 22 BRPM | HEART RATE: 85 BPM

## 2019-12-25 VITALS
OXYGEN SATURATION: 91 % | HEIGHT: 65 IN | DIASTOLIC BLOOD PRESSURE: 76 MMHG | HEART RATE: 102 BPM | TEMPERATURE: 98 F | WEIGHT: 270.07 LBS | SYSTOLIC BLOOD PRESSURE: 152 MMHG | RESPIRATION RATE: 24 BRPM

## 2019-12-25 DIAGNOSIS — L03.115 CELLULITIS OF RIGHT LOWER LIMB: ICD-10-CM

## 2019-12-25 DIAGNOSIS — Z98.89 OTHER SPECIFIED POSTPROCEDURAL STATES: Chronic | ICD-10-CM

## 2019-12-25 DIAGNOSIS — I10 ESSENTIAL (PRIMARY) HYPERTENSION: ICD-10-CM

## 2019-12-25 DIAGNOSIS — E11.9 TYPE 2 DIABETES MELLITUS WITHOUT COMPLICATIONS: ICD-10-CM

## 2019-12-25 DIAGNOSIS — M79.89 OTHER SPECIFIED SOFT TISSUE DISORDERS: ICD-10-CM

## 2019-12-25 DIAGNOSIS — Z79.84 LONG TERM (CURRENT) USE OF ORAL HYPOGLYCEMIC DRUGS: ICD-10-CM

## 2019-12-25 LAB
ALBUMIN SERPL ELPH-MCNC: 3.4 G/DL — SIGNIFICANT CHANGE UP (ref 3.3–5)
ALP SERPL-CCNC: 107 U/L — SIGNIFICANT CHANGE UP (ref 40–120)
ALT FLD-CCNC: 25 U/L — SIGNIFICANT CHANGE UP (ref 12–78)
ANION GAP SERPL CALC-SCNC: 5 MMOL/L — SIGNIFICANT CHANGE UP (ref 5–17)
APTT BLD: 28.8 SEC — SIGNIFICANT CHANGE UP (ref 27.5–36.3)
AST SERPL-CCNC: 13 U/L — LOW (ref 15–37)
BASOPHILS # BLD AUTO: 0.03 K/UL — SIGNIFICANT CHANGE UP (ref 0–0.2)
BASOPHILS NFR BLD AUTO: 0.3 % — SIGNIFICANT CHANGE UP (ref 0–2)
BILIRUB SERPL-MCNC: 0.4 MG/DL — SIGNIFICANT CHANGE UP (ref 0.2–1.2)
BUN SERPL-MCNC: 13 MG/DL — SIGNIFICANT CHANGE UP (ref 7–23)
CALCIUM SERPL-MCNC: 8.7 MG/DL — SIGNIFICANT CHANGE UP (ref 8.5–10.1)
CHLORIDE SERPL-SCNC: 101 MMOL/L — SIGNIFICANT CHANGE UP (ref 96–108)
CK MB CFR SERPL CALC: 2.8 NG/ML — SIGNIFICANT CHANGE UP (ref 0.5–3.6)
CO2 SERPL-SCNC: 35 MMOL/L — HIGH (ref 22–31)
CREAT SERPL-MCNC: 0.89 MG/DL — SIGNIFICANT CHANGE UP (ref 0.5–1.3)
EOSINOPHIL # BLD AUTO: 0.12 K/UL — SIGNIFICANT CHANGE UP (ref 0–0.5)
EOSINOPHIL NFR BLD AUTO: 1.3 % — SIGNIFICANT CHANGE UP (ref 0–6)
GLUCOSE SERPL-MCNC: 256 MG/DL — HIGH (ref 70–99)
HCT VFR BLD CALC: 44.7 % — SIGNIFICANT CHANGE UP (ref 34.5–45)
HGB BLD-MCNC: 13.2 G/DL — SIGNIFICANT CHANGE UP (ref 11.5–15.5)
IMM GRANULOCYTES NFR BLD AUTO: 0.2 % — SIGNIFICANT CHANGE UP (ref 0–1.5)
INR BLD: 1.14 RATIO — SIGNIFICANT CHANGE UP (ref 0.88–1.16)
LACTATE SERPL-SCNC: 1 MMOL/L — SIGNIFICANT CHANGE UP (ref 0.7–2)
LYMPHOCYTES # BLD AUTO: 1.89 K/UL — SIGNIFICANT CHANGE UP (ref 1–3.3)
LYMPHOCYTES # BLD AUTO: 20.1 % — SIGNIFICANT CHANGE UP (ref 13–44)
MCHC RBC-ENTMCNC: 26 PG — LOW (ref 27–34)
MCHC RBC-ENTMCNC: 29.5 GM/DL — LOW (ref 32–36)
MCV RBC AUTO: 88.2 FL — SIGNIFICANT CHANGE UP (ref 80–100)
MONOCYTES # BLD AUTO: 0.62 K/UL — SIGNIFICANT CHANGE UP (ref 0–0.9)
MONOCYTES NFR BLD AUTO: 6.6 % — SIGNIFICANT CHANGE UP (ref 2–14)
NEUTROPHILS # BLD AUTO: 6.74 K/UL — SIGNIFICANT CHANGE UP (ref 1.8–7.4)
NEUTROPHILS NFR BLD AUTO: 71.5 % — SIGNIFICANT CHANGE UP (ref 43–77)
NRBC # BLD: 0 /100 WBCS — SIGNIFICANT CHANGE UP (ref 0–0)
NT-PROBNP SERPL-SCNC: 83 PG/ML — SIGNIFICANT CHANGE UP (ref 0–125)
PLATELET # BLD AUTO: 226 K/UL — SIGNIFICANT CHANGE UP (ref 150–400)
POTASSIUM SERPL-MCNC: 4 MMOL/L — SIGNIFICANT CHANGE UP (ref 3.5–5.3)
POTASSIUM SERPL-SCNC: 4 MMOL/L — SIGNIFICANT CHANGE UP (ref 3.5–5.3)
PROT SERPL-MCNC: 7.7 GM/DL — SIGNIFICANT CHANGE UP (ref 6–8.3)
PROTHROM AB SERPL-ACNC: 12.8 SEC — SIGNIFICANT CHANGE UP (ref 10–12.9)
RBC # BLD: 5.07 M/UL — SIGNIFICANT CHANGE UP (ref 3.8–5.2)
RBC # FLD: 16.1 % — HIGH (ref 10.3–14.5)
SODIUM SERPL-SCNC: 141 MMOL/L — SIGNIFICANT CHANGE UP (ref 135–145)
TROPONIN I SERPL-MCNC: <.015 NG/ML — SIGNIFICANT CHANGE UP (ref 0.01–0.04)
WBC # BLD: 9.42 K/UL — SIGNIFICANT CHANGE UP (ref 3.8–10.5)
WBC # FLD AUTO: 9.42 K/UL — SIGNIFICANT CHANGE UP (ref 3.8–10.5)

## 2019-12-25 PROCEDURE — 93971 EXTREMITY STUDY: CPT | Mod: 26,RT

## 2019-12-25 PROCEDURE — 99284 EMERGENCY DEPT VISIT MOD MDM: CPT

## 2019-12-25 PROCEDURE — 93010 ELECTROCARDIOGRAM REPORT: CPT

## 2019-12-25 RX ORDER — CEPHALEXIN 500 MG
500 CAPSULE ORAL ONCE
Refills: 0 | Status: COMPLETED | OUTPATIENT
Start: 2019-12-25 | End: 2019-12-25

## 2019-12-25 RX ORDER — GABAPENTIN 400 MG/1
2 CAPSULE ORAL
Qty: 0 | Refills: 0 | DISCHARGE

## 2019-12-25 RX ORDER — ACETAMINOPHEN 500 MG
2 TABLET ORAL
Qty: 40 | Refills: 0
Start: 2019-12-25 | End: 2019-12-29

## 2019-12-25 RX ORDER — ACETAMINOPHEN 500 MG
975 TABLET ORAL ONCE
Refills: 0 | Status: COMPLETED | OUTPATIENT
Start: 2019-12-25 | End: 2019-12-25

## 2019-12-25 RX ORDER — AMLODIPINE BESYLATE 2.5 MG/1
1 TABLET ORAL
Qty: 0 | Refills: 0 | DISCHARGE

## 2019-12-25 RX ORDER — ATORVASTATIN CALCIUM 80 MG/1
1 TABLET, FILM COATED ORAL
Qty: 0 | Refills: 0 | DISCHARGE

## 2019-12-25 RX ORDER — INSULIN LISPRO 100/ML
0 VIAL (ML) SUBCUTANEOUS
Qty: 0 | Refills: 0 | DISCHARGE

## 2019-12-25 RX ORDER — FUROSEMIDE 40 MG
1 TABLET ORAL
Qty: 0 | Refills: 0 | DISCHARGE

## 2019-12-25 RX ORDER — INSULIN GLARGINE 100 [IU]/ML
50 INJECTION, SOLUTION SUBCUTANEOUS
Qty: 0 | Refills: 0 | DISCHARGE

## 2019-12-25 RX ORDER — CEPHALEXIN 500 MG
1 CAPSULE ORAL
Qty: 14 | Refills: 0
Start: 2019-12-25 | End: 2019-12-31

## 2019-12-25 RX ADMIN — Medication 975 MILLIGRAM(S): at 21:45

## 2019-12-25 RX ADMIN — Medication 500 MILLIGRAM(S): at 23:39

## 2019-12-25 RX ADMIN — Medication 975 MILLIGRAM(S): at 23:00

## 2019-12-25 NOTE — ED PROVIDER NOTE - OBJECTIVE STATEMENT
59 yo F with RLE swelling and pain for months.  Pt. reports RLE swelling worse on R than L since  (6 months ago).  Pt. was expecting the swelling to go down in the winter (which usually happens) but LLE swelling did not improve.  Instead the anterior aspect of RLE shin became warm and red and painful to touch, which concerned patient.  No other complaints/trauma/inciting event.  Pt. feels well otherwise.   ROS: negative for fever, cough, headache, chest pain, shortness of breath, abd pain, nausea, vomiting, diarrhea, rash, paresthesia, and weakness--all other systems reviewed are negative.   PMH: HTN, DM, hx of ; Meds: See EMR; SH: Denies smoking/drinking/drug use

## 2019-12-25 NOTE — ED ADULT NURSE NOTE - OBJECTIVE STATEMENT
pt received to bed 14 c/o pain, swelling, and warmth to right lower leg. pt states "my legs have edema every summer but usually goes away when it gets cold out. The left leg edema went away, but the right didn't. The doctor upped my water pill by that hasn't helped." right lower leg feels taut and warm to touch. left lower leg is cool to touch. c/o difficulty walking, intermittent chest pain. denies chest pain at this time. denies: shortness of breath, headache, dizzy. pt is ambulating with a cane at this time, but usually ambulates without assistance. pt supposed to be on O2 at home, but is non complaint, as pt does not have the correct supplies, as per pt. on cardiac monitor at bedside. pt's son and boyfriend at bedside pt received to bed 14 c/o pain, swelling, and warmth to right lower leg. pt states "my legs have edema every summer but usually goes away when it gets cold out. The left leg edema went away, but the right didn't. The doctor upped my water pill by that hasn't helped." right lower leg feels taut and warm to touch. left lower leg is cool to touch. c/o difficulty walking, intermittent chest pain. denies chest pain at this time. denies: shortness of breath, headache, dizzy. pt is ambulating with a cane at this time, but usually ambulates without assistance. pt supposed to be on O2 at home, but is non complaint, as pt does not have the correct supplies, as per pt. pedal pulses palpable. on cardiac monitor at bedside. pt's son and boyfriend at bedside

## 2019-12-25 NOTE — ED PROVIDER NOTE - CARE PROVIDER_API CALL
Bennett Chavez)  Surgery  [address], [address]  [place] [address]  Phone: [phone]  Fax: [phone]  Follow Up Time: 4-6 Days

## 2019-12-25 NOTE — ED ADULT TRIAGE NOTE - CHIEF COMPLAINT QUOTE
Right leg swollen since summer. Went to MD November doppler negative. Today swelling in more areas with redness. Warm to touch  SOB in triage. POX 87%- O2 dependent at home but noncompliant.

## 2019-12-25 NOTE — ED ADULT NURSE NOTE - NSIMPLEMENTINTERV_GEN_ALL_ED
Implemented All Fall Risk Interventions:  [place] to call system. Call bell, personal items and telephone within reach. Instruct patient to call for assistance. Room bathroom lighting operational. Non-slip footwear when patient is off stretcher. Physically safe environment: no spills, clutter or unnecessary equipment. Stretcher in lowest position, wheels locked, appropriate side rails in place. Provide visual cue, wrist band, yellow gown, etc. Monitor gait and stability. Monitor for mental status changes and reorient to person, place, and time. Review medications for side effects contributing to fall risk. Reinforce activity limits and safety measures with patient and family.

## 2019-12-25 NOTE — ED PROVIDER NOTE - PHYSICAL EXAMINATION
Vitals: HTN at 144/74, otherwise WNL  Gen: AAOx3, NAD, sitting comfortably in stretcher, calm, cooperative, non-toxic   Head: ncat, perrla, eomi b/l  Neck: supple, no lymphadenopathy, no midline deviation  Heart: rrr, no m/r/g  Lungs: CTA b/l, no rales/ronchi/wheezes  Abd: soft, nontender, non-distended, no rebound or guarding  Ext: no clubbing/cyanosis, 2+ b/l LE edema anterior tibial, erythema 10x15 cm area of anterior lower tibial area, no skin breaks in RLE, + TTP locally over R anterior tibia, + calf tenderness on R  Neuro: sensation and muscle strength intact b/l, antalgic gait

## 2019-12-25 NOTE — ED PROVIDER NOTE - PATIENT PORTAL LINK FT
You can access the FollowMyHealth Patient Portal offered by [facility] by registering at the following website: [email or web address]. By joining [facility]’s FollowMyHealth portal, you will also be able to view your health information using other applications (apps) compatible with our system.

## 2019-12-25 NOTE — ED PROVIDER NOTE - CLINICAL SUMMARY MEDICAL DECISION MAKING FREE TEXT BOX
59 yo F with RLE cellulitis, possible DVT  -basic labs, coags, lactate, bnp, us RLE duplex venous, tylenol, keflex for cellulitis   -f/u results, reeval

## 2020-03-12 ENCOUNTER — APPOINTMENT (OUTPATIENT)
Dept: PULMONOLOGY | Facility: CLINIC | Age: 60
End: 2020-03-12

## 2020-03-26 ENCOUNTER — RX RENEWAL (OUTPATIENT)
Age: 60
End: 2020-03-26

## 2020-03-26 RX ORDER — UMECLIDINIUM 62.5 UG/1
62.5 AEROSOL, POWDER ORAL DAILY
Qty: 30 | Refills: 5 | Status: ACTIVE | COMMUNITY
Start: 2019-12-17 | End: 1900-01-01

## 2020-04-17 ENCOUNTER — APPOINTMENT (OUTPATIENT)
Dept: PULMONOLOGY | Facility: CLINIC | Age: 60
End: 2020-04-17
Payer: MEDICAID

## 2020-04-17 DIAGNOSIS — E66.01 MORBID (SEVERE) OBESITY DUE TO EXCESS CALORIES: ICD-10-CM

## 2020-04-17 PROCEDURE — 99214 OFFICE O/P EST MOD 30 MIN: CPT | Mod: 95

## 2020-04-17 NOTE — HISTORY OF PRESENT ILLNESS
[Patient] : the patient [Self] : self [Difficulty Breathing During Exertion] : stable dyspnea on exertion [Feelings Of Weakness On Exertion] : stable exercise intolerance [Cough] : denies coughing [Wheezing] : denies wheezing [Regional Soft Tissue Swelling Both Lower Extremities] : stable lower extremity edema [Chest Pain Or Discomfort] : denies chest pain [Fever] : denies fever [FreeTextEntry2] : Renetta Morrissey [FreeTextEntry1] : Verbal consent given on April 17, 2020 and at time 2:10 pm by the patient, Renetta Morrissey.\par \par 59 yoF here for followup of severe EDILBERTO, AHI of 32 pre-bipap on a split study. On Bilevel 20/9 with 6 L O2 PM.  Reports improved sleep quality, \par \par No further awakenings at night, wakes up more refreshed, no more "brain fog" during the daytime and denies excessive daytime somnolence. She reports that her daughter was ill and she was her caregiver and has been averaging 4-5 hours of total sleep time, denies naps. Uses a full facemask. During the daytime uses in a GEN portable oxygen concentrator at 4 L per minute. Current weight is about the same at 268 pounds. She knows how to increase the humidity on her bilevel device for comfort.\par \par Using Incruse for her breathing without issues.\par \par CT Chest- didn't bring CD rom. Report 7/29/19 demonstrates 3 mm nodule in posterior segment of RUL. Patient is of low risk, non smoker, no h/o CA personally-will not need further f/u for this nodule. Mosaic attenuation pattern bilaterally, likely air trapping. No evidence of bronchiectasis or pulmonary fibrosis, no pleural effusion, or pericardial effusion. No lymphadenopathy. 1.6cm left adrenal nodule which measures fluid attenuation, consistent with an adenoma. PRIOR CT ABD/CHEST from 2019 and 2017 respectively. Repeat imaging in 1 year to follow up size of adrenal nodule.\par \par CV: unclear of last echo or stress test. Previous cardiologist no longer at practice and hasn't f/u since 2018. Is currently on HTN med and diuretics under discretion of PCP. \par \par

## 2020-04-17 NOTE — REVIEW OF SYSTEMS
[Hypertension] : ~T hypertension [Edema] : ~T edema was present [Arthralgias] : arthralgias [Diabetes] : diabetes mellitus [As Noted in HPI] : as noted in HPI [Negative] : Psychiatric [Snoring] : no snoring [Nonrestorative Sleep] : restorative sleep [Awakes With Headache] : awakes without a headache [Awakes With Dry Mouth] : awakes without dry mouth [FreeTextEntry8] : SOB with exertion

## 2020-04-17 NOTE — ASSESSMENT
[FreeTextEntry1] : 59 yoF here for followup of severe EDILBERTO, AHI of 32 pre-bipap on a split study. On Bilevel 20/9 with 6 L O2 PM.  Reports improved sleep quality, Patient benefitting from Bilevel therapy: No further awakenings at night, wakes up more refreshed, no more "brain fog" during the daytime and denies excessive daytime somnolence. \par \par Wireless data download from 3/18-4/16/20 demonstrates average usage of 4 hours, 47 minutes. 63% of the time was at least 4 hours usage. On bilevel 20/9, AHI of 5.6. 95th percentile air leak of 13.9 L. There are days of increased AHI based on report.\par \par Reports that "pressure is not enough." Will wirelessly increased to 22/11 from 20/9. 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 I think the limited PAP usage is because of TST and not non-adherence, especially since she is reporting great benefit from it. \par I advised her to call me in one month to recheck a data download off of the new pressure of 22/11 or sooner if needed. \par \par Using Incruse for her breathing without issues.\par Uses Snapcious portable concentrator during daytime 4 LPM.

## 2020-04-20 ENCOUNTER — FORM ENCOUNTER (OUTPATIENT)
Age: 60
End: 2020-04-20

## 2020-05-19 ENCOUNTER — FORM ENCOUNTER (OUTPATIENT)
Age: 60
End: 2020-05-19

## 2020-05-21 ENCOUNTER — APPOINTMENT (OUTPATIENT)
Dept: PULMONOLOGY | Facility: CLINIC | Age: 60
End: 2020-05-21
Payer: MEDICAID

## 2020-05-21 PROCEDURE — 99441: CPT

## 2020-05-21 NOTE — REVIEW OF SYSTEMS
[Hypertension] : ~T hypertension [Arthralgias] : arthralgias [Edema] : ~T edema was present [As Noted in HPI] : as noted in HPI [Diabetes] : diabetes mellitus [Negative] : Psychiatric [Snoring] : no snoring [Awakes With Headache] : awakes without a headache [Nonrestorative Sleep] : restorative sleep [FreeTextEntry8] : SOB with exertion [Awakes With Dry Mouth] : awakes without dry mouth

## 2020-05-21 NOTE — ASSESSMENT
[FreeTextEntry1] : 59 yoF here for followup of severe EDILBERTO, AHI of 32 pre-bipap on a split study. On Bilevel 20/9 with 6 L O2 PM.  Reports improved sleep quality, Patient benefitting from Bilevel therapy: No further awakenings at night, wakes up more refreshed, no more "brain fog" during the daytime and denies excessive daytime somnolence. \par \par Wireless data download from 3/18-4/16/20 demonstrates average usage of 4 hours, 47 minutes. 63% of the time was at least 4 hours usage. On bilevel 20/9, AHI of 5.6. 95th percentile air leak of 13.9 L. There are days of increased AHI based on report.\par \par Reports that "pressure is not enough." Will wirelessly increased to 22/11 from 20/9. 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 I think the limited PAP usage is because of TST and not non-adherence, especially since she is reporting great benefit from it. \par I advised her to call me in one month to recheck a data download off of the new pressure of 22/11 or sooner if needed. \par \par Using Incruse for her breathing without issues.\par Uses [employer] portable concentrator during daytime 4 LPM.

## 2020-05-21 NOTE — HISTORY OF PRESENT ILLNESS
[Patient] : the patient [Self] : self [Feelings Of Weakness On Exertion] : stable exercise intolerance [Difficulty Breathing During Exertion] : stable dyspnea on exertion [Cough] : denies coughing [Wheezing] : denies wheezing [Regional Soft Tissue Swelling Both Lower Extremities] : stable lower extremity edema [Fever] : denies fever [Chest Pain Or Discomfort] : denies chest pain [FreeTextEntry1] : Verbal consent given on April 17, 2020 and at time 2:10 pm by the patient, Renetta Morrissey.\par \par 59 yoF here for followup of severe EDILBERTO, AHI of 32 pre-bipap on a split study. On Bilevel 20/9 with 6 L O2 PM.  Reports improved sleep quality, \par \par No further awakenings at night, wakes up more refreshed, no more "brain fog" during the daytime and denies excessive daytime somnolence. She reports that her daughter was ill and she was her caregiver and has been averaging 4-5 hours of total sleep time, denies naps. Uses a full facemask. During the daytime uses in a GEN portable oxygen concentrator at 4 L per minute. Current weight is about the same at 268 pounds. She knows how to increase the humidity on her bilevel device for comfort.\par \par Using Incruse for her breathing without issues.\par \par CT Chest- didn't bring CD rom. Report 7/29/19 demonstrates 3 mm nodule in posterior segment of RUL. Patient is of low risk, non smoker, no h/o CA personally-will not need further f/u for this nodule. Mosaic attenuation pattern bilaterally, likely air trapping. No evidence of bronchiectasis or pulmonary fibrosis, no pleural effusion, or pericardial effusion. No lymphadenopathy. 1.6cm left adrenal nodule which measures fluid attenuation, consistent with an adenoma. PRIOR CT ABD/CHEST from 2019 and 2017 respectively. Repeat imaging in 1 year to follow up size of adrenal nodule.\par \par CV: unclear of last echo or stress test. Previous cardiologist no longer at practice and hasn't f/u since 2018. Is currently on HTN med and diuretics under discretion of PCP. \par \par  [FreeTextEntry2] : Renetta Morrissey

## 2021-02-25 ENCOUNTER — RESULT REVIEW (OUTPATIENT)
Age: 61
End: 2021-02-25

## 2022-05-19 ENCOUNTER — APPOINTMENT (OUTPATIENT)
Dept: POPULATION HEALTH | Facility: CLINIC | Age: 62
End: 2022-05-19

## 2022-05-19 ENCOUNTER — APPOINTMENT (OUTPATIENT)
Dept: PULMONOLOGY | Facility: CLINIC | Age: 62
End: 2022-05-19
Payer: MEDICAID

## 2022-05-19 DIAGNOSIS — J98.4 OTHER DISORDERS OF LUNG: ICD-10-CM

## 2022-05-19 DIAGNOSIS — R09.02 HYPOXEMIA: ICD-10-CM

## 2022-05-19 PROCEDURE — 99215 OFFICE O/P EST HI 40 MIN: CPT | Mod: 95

## 2022-05-19 RX ORDER — METFORMIN HYDROCHLORIDE 1000 MG/1
1000 TABLET, COATED ORAL
Refills: 0 | Status: ACTIVE | COMMUNITY

## 2022-05-19 RX ORDER — LISINOPRIL 30 MG/1
TABLET ORAL
Refills: 0 | Status: DISCONTINUED | COMMUNITY
End: 2022-05-19

## 2022-05-19 RX ORDER — EXENATIDE 2 MG/.85ML
2 INJECTION, SUSPENSION, EXTENDED RELEASE SUBCUTANEOUS
Refills: 0 | Status: ACTIVE | COMMUNITY
Start: 2022-05-19

## 2022-05-19 RX ORDER — METHYL SALICYLATE, MENTHOL, CAPSAICIN .375; 50; 2 MG/ML; MG/ML; MG/ML
0.0375-5-2 CREAM TOPICAL
Refills: 0 | Status: DISCONTINUED | COMMUNITY
End: 2022-05-19

## 2022-05-19 RX ORDER — FUROSEMIDE 20 MG/1
20 TABLET ORAL
Refills: 0 | Status: DISCONTINUED | COMMUNITY
End: 2022-05-19

## 2022-05-19 RX ORDER — POTASSIUM CHLORIDE 1500 MG/1
20 TABLET, EXTENDED RELEASE ORAL DAILY
Refills: 0 | Status: DISCONTINUED | COMMUNITY
End: 2022-05-19

## 2022-05-19 RX ORDER — GABAPENTIN 100 MG
100 TABLET ORAL
Refills: 0 | Status: DISCONTINUED | COMMUNITY
End: 2022-05-19

## 2022-05-19 NOTE — ASSESSMENT
[FreeTextEntry1] : 61 yoF here for followup of severe EDILBERTO, AHI of 32 pre-bipap on a split study. On Bilevel 2/11 with 6 L O2 PM.  Reports improved sleep quality, Patient benefitting from Bilevel therapy: No further awakenings at night, wakes up more refreshed, no more "brain fog" during the daytime and denies excessive daytime somnolence. \par \par Pulm/Sleep - chronic hypoxemic respiratory failure with restrictive lung disease, severe EDILBERTO/OHS. \par Wireless data download from 4/19/22-5/18/22 demonstrates average usage of 5 hours, 43 minutes. 87% of the time was at least 4 hours usage. On bilevel 22/11, AHI of 1.1. 95th percentile air leak of 12.9 L. \par Continue Bilevel. \par \par Self-stopped Incruse due to sharp rib pain, reports her breathing is stable without issues.Encouraged use of POC during daytime with exertion however pt is limited by taking care of her daughter and mother. \par Has Scan[employer]ugen portable concentrator, needs during daytime 4 LPM.\par \par f/u in August, 2022 for OV, PFT and ABG, needs pre-op clearance prior to knee replacement surgery (not scheduled yet). \par \par Reminded pt to f/u regarding left adrenal nodule with endocrine/PCP. \par

## 2022-05-19 NOTE — HISTORY OF PRESENT ILLNESS
[Patient] : the patient [Self] : self [Difficulty Breathing During Exertion] : stable dyspnea on exertion [Feelings Of Weakness On Exertion] : stable exercise intolerance [Cough] : denies coughing [Wheezing] : denies wheezing [Regional Soft Tissue Swelling Both Lower Extremities] : stable lower extremity edema [Chest Pain Or Discomfort] : denies chest pain [Fever] : denies fever [Obstructive Sleep Apnea] : obstructive sleep apnea [Unintentional Sleep while Active] : unintentional sleep while active [Unintentional Sleep While Inactive] : unintentional sleep while inactive [Awakening With Dry Mouth] : awakening with dry mouth [Daytime Somnolence] : daytime somnolence [To Bed: ___] : ~he/she~ goes to bed at [unfilled] [Arises: ___] : arises at [unfilled] [Sleep Onset Latency: ___ minutes] : sleep onset latency of [unfilled] minutes reported [Nocturnal Awakenings: ___] : ~he/she~ typically has [unfilled] nocturnal awakenings [WASO: ___] : Wake time after sleep onset is [unfilled] [TST: ___] : Total sleep time is [unfilled] [Daytime Sleep: ___] : daytime sleep: [unfilled] [Home] : at home, [unfilled] , at the time of the visit. [Medical Office: ([facility])___] : at the medical office located in  [Stable] : are stable [Oxygen] : the patient uses supplemental oxygen [Oxygen Rate  ___ lpm] : Oxygen rate is [unfilled] lpm [NC] : Nasal Cannula [Nightly] : nightly [4  -  Somewhat severe] : 4, somewhat severe [FreeTextEntry1] : \par \par 61  yoF here for followup of severe EDILBERTO, AHI of 32 pre-bipap on a split study. On Bilevel  22/11 with 6 L O2 PM since 4/2020.  Reports improved sleep quality, \par \par No further awakenings at night, wakes up  refreshed, no more "brain fog" during the daytime and denies excessive daytime somnolence. She reports that her daughter was ill and she was her caregiver and has been averaging 4-6 hours of total sleep time, 1 hr  nap sometimes. Uses a full facemask. She does not use O2 during the day due to busy schedule.  Current weight is about 255  pounds. \par \par Used  Incruse for her breathing, stopped the use due to recurrent sharp rib pain\par \par CT Chest- didn't bring CD rom. Report 7/29/19 demonstrates 3 mm nodule in posterior segment of RUL. Patient is of low risk, non smoker, no h/o CA personally-will not need further f/u for this nodule. Mosaic attenuation pattern bilaterally, likely air trapping. No evidence of bronchiectasis or pulmonary fibrosis, no pleural effusion, or pericardial effusion. No lymphadenopathy. 1.6cm left adrenal nodule which measures fluid attenuation, consistent with an adenoma. PRIOR CT ABD/CHEST from 2019 and 2017 respectively. Repeat imaging in 1 year to follow up size of adrenal nodule.\par \par CV: unclear of last echo or stress test. Previous cardiologist no longer at practice and hasn't f/u since 2018. Is currently on HTN med and diuretics under discretion of PCP. \par \par  [Snoring] : no snoring [Witnessed Apneas] : no witnessed sleep apnea [Frequent Nocturnal Awakening] : no nocturnal awakening [Awakes Unrefreshed] : does not awake unrefreshed [Awakes with Headache] : no headache upon awakening [DIS] : no DIS [DMS] : no DMS [ESS] : 12 [FreeTextEntry2] : Renetta Morrissey

## 2022-05-19 NOTE — REASON FOR VISIT
[Follow-Up] : a follow-up visit [Sleep Apnea] : sleep apnea [Home] : at home, [unfilled] , at the time of the visit. [Medical Office: ([facility])___] : at the medical office located in  [Edema] : edema

## 2022-05-19 NOTE — REVIEW OF SYSTEMS
[Hypertension] : ~T hypertension [Edema] : ~T edema was present [Diabetes] : diabetes mellitus [As Noted in HPI] : as noted in HPI [EDS: ESS=____] : daytime somnolence: ESS=[unfilled] [Fatigue] : fatigue [Recent Wt Loss (___ Lbs)] : recent [unfilled] ~Ulb weight loss [Shortness Of Breath] : shortness of breath [A.M. Dry Mouth] : a.m. dry mouth [Obesity] : obesity [Diabetes] : diabetes  [Arthralgias] : arthralgias [Depression] : depression [Negative] : Musculoskeletal [Difficulty Initiating Sleep] : no difficulty falling asleep [Difficulty Maintaining Sleep] : no difficulty maintaining sleep [Acute Insomnia] : no acute insomnia [Chronic Insomnia] : no chronic  insomnia [Lower Extremity Discomfort] : no lower extremity discomfort [Irresistible urge to move legs] : no irresistible urge to move legs because of lower extremity discomfort [LE discomfort relieved by movement] : lower extremity discomfort not relieved by movement [Unusual Sleep Behavior] : no unusual sleep behavior [FreeTextEntry6] : occasional leg cramp 2-3 x per week [Snoring] : no snoring [Nonrestorative Sleep] : restorative sleep [Awakes With Headache] : awakes without a headache [Awakes With Dry Mouth] : awakes without dry mouth [FreeTextEntry8] : SOB with exertion

## 2022-05-19 NOTE — END OF VISIT
[FreeTextEntry3] : I, Dr. Eleni Dickinson, personally performed the evaluation and management (E/M) services for this established patient who presents today with (a) new problem(s)/exacerbation of (an) existing condition(s).  That E/M includes conducting the examination, assessing all new/exacerbated conditions, and establishing a new plan of care.  Today, Gui Shepherd ACP, was here to observe my evaluation and management services and scribe.\par \par 40 minutes time spent for patient education related to comorbidities and medications, medical records/labs/radiology reviews, preventative care, documentation, coordination of care.\par

## 2022-08-25 ENCOUNTER — APPOINTMENT (OUTPATIENT)
Dept: PULMONOLOGY | Facility: CLINIC | Age: 62
End: 2022-08-25

## 2022-08-25 VITALS
HEART RATE: 87 BPM | DIASTOLIC BLOOD PRESSURE: 91 MMHG | RESPIRATION RATE: 17 BRPM | BODY MASS INDEX: 43.71 KG/M2 | OXYGEN SATURATION: 97 % | TEMPERATURE: 97.3 F | SYSTOLIC BLOOD PRESSURE: 166 MMHG | WEIGHT: 256 LBS | HEIGHT: 64 IN

## 2022-08-25 DIAGNOSIS — Z20.822 CONTACT WITH AND (SUSPECTED) EXPOSURE TO COVID-19: ICD-10-CM

## 2022-08-25 LAB — SARS-COV-2 AG RESP QL IA.RAPID: NEGATIVE

## 2022-08-25 PROCEDURE — ZZZZZ: CPT

## 2022-08-25 PROCEDURE — 94010 BREATHING CAPACITY TEST: CPT

## 2022-08-25 PROCEDURE — 36600 WITHDRAWAL OF ARTERIAL BLOOD: CPT | Mod: 59

## 2022-08-25 PROCEDURE — 94729 DIFFUSING CAPACITY: CPT

## 2022-08-25 PROCEDURE — 99215 OFFICE O/P EST HI 40 MIN: CPT | Mod: 25

## 2022-08-25 PROCEDURE — 94726 PLETHYSMOGRAPHY LUNG VOLUMES: CPT

## 2022-08-25 PROCEDURE — 82803 BLOOD GASES ANY COMBINATION: CPT

## 2022-08-25 PROCEDURE — 87811 SARS-COV-2 COVID19 W/OPTIC: CPT

## 2022-08-25 NOTE — ASSESSMENT
[FreeTextEntry1] : 61 y.o. female with EDILBERTO on BIPAP and T2DM here for follow up and pre-operative evaluation for knee replacement surgery \par \par Plan \par EDILBERTO\par - c/w with current BiPAP at 22/11 with 6L NC\par - Device data downloaded and reviewed, patient remains compliant AHI is normal 1.5. \par \par Pulm - FEV1 1.63 (77% predicted). Mild restrictive ventilatory defect. Improved FEV1 from prior. ABG - no evidence of hypercapnia\par \par Pre-operative Evaluation \par - Patient has >4 METS\par - ABG shows a normal pH and no signs of hypercapnia \par -  ARISCAT score of 19\par - 1.6% risk of in-hospital post-op pulmonary complications (composite including respiratory failure, respiratory infection, pleural effusion, atelectasis, pneumothorax, bronchospasm, aspiration pneumonitis)\par - Following surgery would extubate the patient to BiPAP at home setting of 22/11 \par \par d/w Dr. Dickinson

## 2022-08-25 NOTE — PHYSICAL EXAM
[General Appearance - Well Developed] : well developed [Normal Appearance] : normal appearance [Well Groomed] : well groomed [General Appearance - Well Nourished] : well nourished [No Deformities] : no deformities [General Appearance - In No Acute Distress] : no acute distress [Normal Oropharynx] : normal oropharynx [Heart Rate And Rhythm] : heart rate was normal and rhythm regular [Heart Sounds] : normal S1 and S2 [Heart Sounds Gallop] : no gallops [Murmurs] : no murmurs [Heart Sounds Pericardial Friction Rub] : no pericardial rub [Auscultation Breath Sounds / Voice Sounds] : lungs were clear to auscultation bilaterally [Bowel Sounds] : normal bowel sounds [Abdomen Soft] : soft [Abdomen Tenderness] : non-tender [Abdomen Mass (___ Cm)] : no abdominal mass palpated [Skin Color & Pigmentation] : normal skin color and pigmentation [Skin Turgor] : normal skin turgor [] : no rash [Deep Tendon Reflexes (DTR)] : deep tendon reflexes were 2+ and symmetric [Sensation] : the sensory exam was normal to light touch and pinprick [No Focal Deficits] : no focal deficits [FreeTextEntry1] : walks with a cane

## 2022-08-25 NOTE — END OF VISIT
[] : Fellow [FreeTextEntry3] : Patient benefiting from bilevel therapy with good adherence.  Continue bilevel for EDILBERTO.  Patient plans to schedule knee surgery, awaiting date and will let me know when and where to fax progress note.  Patient is at mild risk for respiratory complications from anesthesia given her history of severe obstructive sleep apnea AHI of 32. Recommend closer post-op monitoring and use of Bilevel post-surgery recovery. \par 45 minutes time spent for patient education related to comorbidities and medications, medical records/labs/radiology reviews, preventative care, documentation, coordination of care.\par

## 2022-08-25 NOTE — HISTORY OF PRESENT ILLNESS
[FreeTextEntry1] : 60 yo female here for follow-up and preop evaluation. Patient has severe EDILBERTO, AHI of 32 pre-bipap on a split study. On Bilevel 22/11 with 6 L O2 PM since 4/2020. Reports improved sleep quality, \par \par No further awakenings at night, wakes up refreshed, no more "brain fog" during the daytime and denies excessive daytime somnolence. She reports that her daughter was ill and she was her caregiver and has been averaging 4-6 hours of total sleep time, 1 hr nap sometimes. Uses a full face mask. She does not use O2 during the day due to busy schedule.  \par \par \par Patient is able to walk multiple flights of stairs before getting short of breath. Denies cough or wheezing. Also denies fever, chills, night sweats, cough, nasal congestion, dyspnea, chest pain, palpations, nausea/vomiting, abd. pain, diarrhea, constipation, dysuria, urinary frequency, leg swelling, joint pain, and rashes. \par \par

## 2023-02-27 NOTE — ED ADULT TRIAGE NOTE - WEIGHT IN LBS
Detail Level: Detailed Add 00998 Cpt? (Important Note: In 2017 The Use Of 68016 Is Being Tracked By Cms To Determine Future Global Period Reimbursement For Global Periods): yes 270

## 2023-09-17 NOTE — ED ADULT NURSE NOTE - NS ED NURSE DC INFO COMPLEXITY
Spoke w/ Dr. Autumn Xavier in regards to increased pt agitation and PRN use. MD ordered precedex.   Krista Bui RN, BSN Verbalized Understanding/Simple: Patient demonstrates quick and easy understanding

## 2023-11-16 ENCOUNTER — APPOINTMENT (OUTPATIENT)
Dept: PULMONOLOGY | Facility: CLINIC | Age: 63
End: 2023-11-16
Payer: MEDICAID

## 2023-11-16 VITALS
DIASTOLIC BLOOD PRESSURE: 82 MMHG | BODY MASS INDEX: 41.15 KG/M2 | HEIGHT: 64 IN | TEMPERATURE: 97 F | WEIGHT: 241 LBS | SYSTOLIC BLOOD PRESSURE: 168 MMHG | OXYGEN SATURATION: 90 % | HEART RATE: 89 BPM | RESPIRATION RATE: 15 BRPM

## 2023-11-16 PROCEDURE — 99214 OFFICE O/P EST MOD 30 MIN: CPT

## 2024-05-30 ENCOUNTER — APPOINTMENT (OUTPATIENT)
Dept: PULMONOLOGY | Facility: CLINIC | Age: 64
End: 2024-05-30
Payer: MEDICAID

## 2024-05-30 VITALS
HEART RATE: 70 BPM | OXYGEN SATURATION: 96 % | HEIGHT: 64 IN | TEMPERATURE: 96.9 F | SYSTOLIC BLOOD PRESSURE: 149 MMHG | RESPIRATION RATE: 16 BRPM | DIASTOLIC BLOOD PRESSURE: 80 MMHG

## 2024-05-30 DIAGNOSIS — Z01.811 ENCOUNTER FOR PREPROCEDURAL RESPIRATORY EXAMINATION: ICD-10-CM

## 2024-05-30 DIAGNOSIS — G47.30 SLEEP APNEA, UNSPECIFIED: ICD-10-CM

## 2024-05-30 PROCEDURE — 99214 OFFICE O/P EST MOD 30 MIN: CPT

## 2024-05-31 PROBLEM — G47.30 SLEEP APNEA: Status: ACTIVE | Noted: 2019-05-15

## 2024-05-31 PROBLEM — Z01.811 PREOPERATIVE RESPIRATORY EXAMINATION: Status: ACTIVE | Noted: 2019-07-18

## 2024-05-31 NOTE — END OF VISIT
[] : Fellow [FreeTextEntry3] : pt at respiratory baseline, here for f/u for EDILBERTO and pre-op respiratory clearance. Pt at mild risk for respiratory complications for anesthesia for knee replacement surgery next month. The anesthesiologist should be made aware of patient's history of obstructive sleep apnea, and take appropriate perioperative sleep apnea-related precautions.  These include extubation only when fully awake and alert and in a non-supine position with close post-operative respiratory monitoring until effects of medications with respiratory depressant properties are resolved.  The patient should use CPAP perioperatively as clinically indicated; she was advised to bring her PAP device with her.  With these precautions the patient should tolerate the proposed surgery from a sleep apnea perspective. Pt is optimzed from a respiratory standpoint. She plans to come back within next 3 mos for next knee surgery.    [Time Spent: ___ minutes] : I have spent [unfilled] minutes of time on the encounter.

## 2024-05-31 NOTE — PHYSICAL EXAM
[General Appearance - Well Developed] : well developed [Normal Appearance] : normal appearance [Well Groomed] : well groomed [General Appearance - Well Nourished] : well nourished [No Deformities] : no deformities [General Appearance - In No Acute Distress] : no acute distress [Normal Conjunctiva] : the conjunctiva exhibited no abnormalities [Eyelids - No Xanthelasma] : the eyelids demonstrated no xanthelasmas [Normal Oropharynx] : normal oropharynx [Neck Appearance] : the appearance of the neck was normal [Heart Rate And Rhythm] : heart rate was normal and rhythm regular [Heart Sounds] : normal S1 and S2 [Respiration, Rhythm And Depth] : normal respiratory rhythm and effort [Auscultation Breath Sounds / Voice Sounds] : lungs were clear to auscultation bilaterally [Skin Color & Pigmentation] : normal skin color and pigmentation [Skin Turgor] : normal skin turgor [] : no rash [No Focal Deficits] : no focal deficits [Oriented To Time, Place, And Person] : oriented to person, place, and time [Impaired Insight] : insight and judgment were intact [Affect] : the affect was normal [Mood] : the mood was normal [FreeTextEntry1] : 2+ b/l pitting edema

## 2024-05-31 NOTE — HISTORY OF PRESENT ILLNESS
[Obstructive Sleep Apnea] : obstructive sleep apnea [FreeTextEntry1] : 64 yo female here for follow-up and preop evaluation. Patient has severe EDILBERTO, AHI of 32 pre-bipap on a split study. On Bilevel 22/11 with 6 L O2 PM since  4/2020. Reports improved sleep quality,  Planned for right sided knee replacement surgery 6/25/24 with Dr. Ghanshyam Dyer at [facility]. Here for pulmonary clearance. ([phone] Fax: [phone])   Still reports decreased amounts of sleep as she is caring for her mother and daughter. Brain fog is resolved with bipap use. Feels refreshed and eneretic in the morning. Has lost 30 pounds intentionally. Offers no pulmonary complaints and reports overall improving exercise tolerance.  [Snoring] : no snoring [Unintentional Sleep while Active] : no unintentional sleep while active [Awakes Unrefreshed] : does not awake unrefreshed [Unintentional Sleep While Inactive] : no unintentional sleep while inactive [Awakes with Headache] : no headache upon awakening [Awakening With Dry Mouth] : no dry mouth upon awakening [ESS] : 8

## 2024-05-31 NOTE — ASSESSMENT
[FreeTextEntry1] : Plan  # EDILBERTO - c/w with current BiPAP at 22/11 with 6L NC - Device data downloaded and reviewed, patient remains compliant with no changes needed in BIPAP settings.  Pulm - FEV1 1.63 (77% predicted). Mild restrictive ventilatory defect. Improved FEV1 from prior. ABG - no evidence of hypercapnia  therapy/compliance data 4/30/24-5/29/24 87% usage > 4 hrs, 6 hrs, 4 min, on Bilevel 22/11, AHI 4.7, no significant air leak.   #Pre-operative Evaluation - Patient has >4 METS - ABG shows a normal pH and no signs of hypercapnia - ARISCAT score of 19 - 1.6% risk of in-hospital post-op pulmonary complications (composite including respiratory failure, respiratory infection, pleural effusion, atelectasis, pneumothorax, bronchospasm, aspiration pneumonitis) - Following surgery would extubate the patient to BiPAP at home setting of 22/11 - Optimized from pulmonary stand point.

## 2025-01-15 NOTE — ED ADULT NURSE NOTE - NS ED NURSE DC INFO COMPLEXITY
Detail Level: Detailed Pt began to have central chest pain within the last hour that he rates 8/10. Pt felt concerned with this pain due to his hx of CHF. Pt also states that he took all his night meds and accidentally grabbed the wrong drink and took a sip but when he realized it was an alcoholic beverage he spit out what he did not swallow and wanted to make sure that he was okay after that as well. Simple: Patient demonstrates quick and easy understanding/Verbalized Understanding Detail Level: Zone